# Patient Record
Sex: FEMALE | Race: WHITE | NOT HISPANIC OR LATINO | Employment: FULL TIME | URBAN - METROPOLITAN AREA
[De-identification: names, ages, dates, MRNs, and addresses within clinical notes are randomized per-mention and may not be internally consistent; named-entity substitution may affect disease eponyms.]

---

## 2022-03-01 ENCOUNTER — OFFICE VISIT (OUTPATIENT)
Dept: OBSTETRICS AND GYNECOLOGY | Facility: CLINIC | Age: 27
End: 2022-03-01

## 2022-03-01 VITALS
SYSTOLIC BLOOD PRESSURE: 124 MMHG | DIASTOLIC BLOOD PRESSURE: 75 MMHG | HEIGHT: 63 IN | WEIGHT: 159 LBS | BODY MASS INDEX: 28.17 KG/M2

## 2022-03-01 DIAGNOSIS — Z30.019 ENCOUNTER FOR INITIAL PRESCRIPTION OF CONTRACEPTIVES, UNSPECIFIED CONTRACEPTIVE: ICD-10-CM

## 2022-03-01 DIAGNOSIS — N89.8 VAGINAL DISCHARGE: ICD-10-CM

## 2022-03-01 DIAGNOSIS — Z01.419 WOMEN'S ANNUAL ROUTINE GYNECOLOGICAL EXAMINATION: Primary | ICD-10-CM

## 2022-03-01 PROCEDURE — 99385 PREV VISIT NEW AGE 18-39: CPT | Performed by: NURSE PRACTITIONER

## 2022-03-01 PROCEDURE — 99213 OFFICE O/P EST LOW 20 MIN: CPT | Performed by: NURSE PRACTITIONER

## 2022-03-01 RX ORDER — ALPRAZOLAM 0.25 MG/1
TABLET ORAL
COMMUNITY
Start: 2022-02-09

## 2022-03-01 RX ORDER — TERBINAFINE HYDROCHLORIDE 250 MG/1
TABLET ORAL
COMMUNITY
Start: 2022-01-25 | End: 2022-06-09

## 2022-03-01 NOTE — PROGRESS NOTES
GYN Annual Exam     Chief Complaint   Patient presents with   • Gynecologic Exam     New patient annual exam      HPI    Jacqueline Paredes is a 26 y.o. female who presents for annual well woman exam.  She is sexually active. Periods are regular every 28-30 days, lasting 5 days. Dysmenorrhea:mild, occurring first 1-2 days of flow. Cyclic symptoms include pelvic pain. Performing SBE:occas. C/o 2-3 episodes of bloody vaginal discharge, with last episode being 2 months ago, she feels there could be a correlation with this and missed doses of anti-depressant. She has one new partner. Denies pelvic pain    Sees gyn-oncology at  for hx of ovarian cyst and prior oophorectomy, she thinks was left that was removed.     She would like to discuss contraceptive options today. She has used NAT in the past. Denies history of migraine with aura, denies history of DVT, there is no family history of DVT. She is a nonsmoker.     This is my first time meeting Jacqueline Paredes  She is new to our office  OB History    No obstetric history on file.       LMP- 2/16/22  Current contraception: condoms  Last Pap- she is unsure  History of abnormal Pap smear: no  History of STD-denies  Family history of uterine, colon or ovarian cancer: no  Family history of breast cancer: no    Past Medical History:   Diagnosis Date   • Anxiety    • Depression        Past Surgical History:   Procedure Laterality Date   • LAPAROTOMY OOPHERECTOMY           Current Outpatient Medications:   •  ALPRAZolam (XANAX) 0.25 MG tablet, , Disp: , Rfl:   •  desvenlafaxine (PRISTIQ) 50 MG 24 hr tablet, Take 50 mg by mouth Daily., Disp: , Rfl:   •  terbinafine (lamiSIL) 250 MG tablet, , Disp: , Rfl:     Allergies   Allergen Reactions   • Latex Other (See Comments)       Social History     Tobacco Use   • Smoking status: Never Smoker   • Smokeless tobacco: Never Used   Substance Use Topics   • Alcohol use: Yes     Comment: soc.   • Drug use: Not on file       History reviewed. No  "pertinent family history.    Review of Systems   Constitutional: Negative for chills, fatigue and fever.   Gastrointestinal: Negative for abdominal distention, abdominal pain, nausea and vomiting.   Genitourinary: Positive for vaginal discharge. Negative for breast discharge, breast lump, breast pain, dysuria, menstrual problem, pelvic pain, pelvic pressure, vaginal bleeding and vaginal pain.   Musculoskeletal: Negative for gait problem.   Skin: Negative for rash.   Neurological: Negative for dizziness and headache.   Psychiatric/Behavioral: Negative for behavioral problems.       /75   Ht 160 cm (63\")   Wt 72.1 kg (159 lb)   LMP 02/16/2022   BMI 28.17 kg/m²     Physical Exam  Constitutional:       Appearance: Normal appearance.   Genitourinary:      Vulva, bladder and urethral meatus normal.      No lesions in the vagina.      Right Labia: No rash, tenderness, lesions, skin changes or Bartholin's cyst.     Left Labia: No tenderness, lesions, skin changes, Bartholin's cyst or rash.     No labial fusion noted.      No inguinal adenopathy present in the right or left side.     Vaginal discharge (thick, clear, moderate amount) present.      No vaginal erythema, tenderness, bleeding or ulceration.      No vaginal prolapse present.     No vaginal atrophy present.       Right Adnexa: not tender, not full, not palpable, no mass present and not absent.     Left Adnexa: not tender, not full, not palpable, no mass present and not absent.     No cervical motion tenderness, discharge, friability, lesion, polyp, nabothian cyst or eversion.      Uterus is not enlarged, fixed, tender, irregular or prolapsed.      No uterine mass detected.     No urethral tenderness or mass present.      Pelvic exam was performed with patient in the lithotomy position.   Breasts: Breasts are symmetrical.      Right: Present. No swelling, bleeding, inverted nipple, mass, nipple discharge, skin change, tenderness, breast implant, axillary " adenopathy or supraclavicular adenopathy.      Left: Present. No swelling, bleeding, inverted nipple, mass, nipple discharge, skin change, tenderness, breast implant, axillary adenopathy or supraclavicular adenopathy.       HENT:      Head: Normocephalic and atraumatic.   Eyes:      Pupils: Pupils are equal, round, and reactive to light.   Cardiovascular:      Rate and Rhythm: Normal rate.   Pulmonary:      Effort: Pulmonary effort is normal.   Abdominal:      General: There is no distension.      Palpations: Abdomen is soft. There is no mass.      Tenderness: There is no abdominal tenderness. There is no guarding.      Hernia: No hernia is present. There is no hernia in the left inguinal area or right inguinal area.   Musculoskeletal:         General: Normal range of motion.      Cervical back: Normal range of motion and neck supple. No tenderness.   Lymphadenopathy:      Cervical: No cervical adenopathy.      Upper Body:      Right upper body: No supraclavicular, axillary or pectoral adenopathy.      Left upper body: No supraclavicular, axillary or pectoral adenopathy.      Lower Body: No right inguinal adenopathy. No left inguinal adenopathy.   Neurological:      General: No focal deficit present.      Mental Status: She is alert and oriented to person, place, and time.      Cranial Nerves: No cranial nerve deficit.   Skin:     General: Skin is warm and dry.   Psychiatric:         Mood and Affect: Mood normal.         Behavior: Behavior normal.         Thought Content: Thought content normal.         Judgment: Judgment normal.   Vitals and nursing note reviewed.       Assessment   Diagnoses and all orders for this visit:    1. Women's annual routine gynecological examination (Primary)  -     IGP, Rfx Aptima HPV ASCU    2. Encounter for initial prescription of contraceptives, unspecified contraceptive    3. Vaginal discharge  -     NuSwab VG+ - Swab, Vagina       Plan   1. Well woman exam: Pap collected Yes.  Recommend MVI daily.    2. Contraception: Discussed contraception options at length including pills, patch, vaginal ring, POPs,  injection, implant, and IUDs.  The risks and benefits of the methods were discussed including but not limited to the increased risk of heart attack, blood clot, and stroke.  It was discussed the contraception does not protect against sexually transmitted infections and condoms are encouraged. She would like to consider her options. We discussed placement procedures, risks vs benefits of IUD. She was provided with pamphlets for Brenda, Kyleena, Mirena, and Paraguard IUDs  3. STD: Enc condoms. STD screen today- Yes. NuSwab  4. Smoking status: nonsmoker  5.  Encouraged annual mammogram screening starting at age 40. Instructed on how to perform SBE. Encouraged breast health self awareness.  6.    Encouraged 150 minutes of exercise per week if not medially contraindicated.   7.    Patient's Body mass index is 28.17 kg/m². indicating that she is overweight by BMI (BMI 25-29.9).   8.    CLARE signed to obtain records from . Several times she mentions seeing local providers, offered referral to uro-gyn. She declines at this time    Follow Up one year or PRN    Rebecca Sorensen, APRN  3/1/2022  14:59 EST

## 2022-03-03 LAB
A VAGINAE DNA VAG QL NAA+PROBE: NORMAL SCORE
BVAB2 DNA VAG QL NAA+PROBE: NORMAL SCORE
C ALBICANS DNA VAG QL NAA+PROBE: NEGATIVE
C GLABRATA DNA VAG QL NAA+PROBE: NEGATIVE
C TRACH DNA VAG QL NAA+PROBE: NEGATIVE
MEGA1 DNA VAG QL NAA+PROBE: NORMAL SCORE
N GONORRHOEA DNA VAG QL NAA+PROBE: NEGATIVE
T VAGINALIS DNA VAG QL NAA+PROBE: NEGATIVE

## 2022-03-08 LAB
CONV .: ABNORMAL
CYTOLOGIST CVX/VAG CYTO: ABNORMAL
CYTOLOGY CVX/VAG DOC CYTO: ABNORMAL
CYTOLOGY CVX/VAG DOC THIN PREP: ABNORMAL
DX ICD CODE: ABNORMAL
DX ICD CODE: ABNORMAL
HIV 1 & 2 AB SER-IMP: ABNORMAL
HPV I/H RISK 4 DNA CVX QL PROBE+SIG AMP: NEGATIVE
OTHER STN SPEC: ABNORMAL
PATHOLOGIST CVX/VAG CYTO: ABNORMAL
STAT OF ADQ CVX/VAG CYTO-IMP: ABNORMAL

## 2022-03-11 ENCOUNTER — TELEPHONE (OUTPATIENT)
Dept: OBSTETRICS AND GYNECOLOGY | Facility: CLINIC | Age: 27
End: 2022-03-11

## 2022-03-11 NOTE — TELEPHONE ENCOUNTER
I called Jacqueline Paredes ot review pap smear results. No answer, left VM to return my call.    Pap smear returned with Atypical cells of undetermined significance. Negative for HPV. Recommend repeating pap smear in one year.      Rebecca Sorensen, APRN  3/11/22  11:12am

## 2022-03-11 NOTE — TELEPHONE ENCOUNTER
I returned Jacqueline Paredes phone call. We reviewed ASCUS pap smear result. Recommend she repeat pap smear in one year.    Rebecca Sorensen, APRN  3/11/22  12:52pm

## 2022-03-11 NOTE — TELEPHONE ENCOUNTER
Pt returning your call. I relayed the results to her but she has questions concerning the atypical cells.     Thanks,  Bhanu

## 2022-03-16 ENCOUNTER — TELEPHONE (OUTPATIENT)
Dept: OBSTETRICS AND GYNECOLOGY | Facility: CLINIC | Age: 27
End: 2022-03-16

## 2022-03-17 ENCOUNTER — TELEPHONE (OUTPATIENT)
Dept: OBSTETRICS AND GYNECOLOGY | Facility: CLINIC | Age: 27
End: 2022-03-17

## 2022-03-17 NOTE — TELEPHONE ENCOUNTER
I returned Jacqueline Paredes call. No answer, left VM to return my call.     Rebecca Sorensen, APRN  3/17/22  8:29am

## 2022-03-17 NOTE — TELEPHONE ENCOUNTER
Jacqueline Paredes returned my call. She is concerned because her boyfriend was recently treated for genital warts. Notified pt recent testing was negative for HPV. She has completed Gardisil vaccine series. Discussed HPV. Encouraged condoms with IC.     Rebecca Sorensen, APRN  3/17/22  11:28am

## 2022-06-09 ENCOUNTER — OFFICE VISIT (OUTPATIENT)
Dept: OBSTETRICS AND GYNECOLOGY | Facility: CLINIC | Age: 27
End: 2022-06-09

## 2022-06-09 VITALS
HEART RATE: 67 BPM | DIASTOLIC BLOOD PRESSURE: 68 MMHG | WEIGHT: 163.2 LBS | BODY MASS INDEX: 28.91 KG/M2 | SYSTOLIC BLOOD PRESSURE: 104 MMHG

## 2022-06-09 DIAGNOSIS — N93.9 VAGINAL SPOTTING: Primary | ICD-10-CM

## 2022-06-09 DIAGNOSIS — Z30.019 ENCOUNTER FOR INITIAL PRESCRIPTION OF CONTRACEPTIVES, UNSPECIFIED CONTRACEPTIVE: ICD-10-CM

## 2022-06-09 DIAGNOSIS — R30.0 DYSURIA: ICD-10-CM

## 2022-06-09 DIAGNOSIS — R10.2 VAGINAL PAIN: ICD-10-CM

## 2022-06-09 LAB
BILIRUB BLD-MCNC: NEGATIVE MG/DL
CLARITY, POC: CLEAR
COLOR UR: YELLOW
GLUCOSE UR STRIP-MCNC: NEGATIVE MG/DL
KETONES UR QL: NEGATIVE
LEUKOCYTE EST, POC: NEGATIVE
NITRITE UR-MCNC: NEGATIVE MG/ML
PH UR: 6.5 [PH] (ref 5–8)
PROT UR STRIP-MCNC: NEGATIVE MG/DL
RBC # UR STRIP: NEGATIVE /UL
SP GR UR: 1.02 (ref 1–1.03)
UROBILINOGEN UR QL: NORMAL

## 2022-06-09 PROCEDURE — 99215 OFFICE O/P EST HI 40 MIN: CPT | Performed by: NURSE PRACTITIONER

## 2022-06-09 PROCEDURE — 81002 URINALYSIS NONAUTO W/O SCOPE: CPT | Performed by: NURSE PRACTITIONER

## 2022-06-09 NOTE — PROGRESS NOTES
"Chief Complaint   Patient presents with   • Contraception     Wants to discuss BC options   • Pelvic Pain   • Vaginal Discharge     Pt c/o bloody discharge   • Dyspareunia     Pt states some pain with intercourse.   • Dysuria     Pt states she does have some burning when wiping after urinating      SUBJECTIVE:     Jacqueline Paredes is a 27 y.o.  who presents with several complaints. C/o discomfort with urination, denies burning with urination. She has pain when she wipes after voiding. C/o vaginal pain, \"like a yeast infection\" however she does not think that she has a yeast infection at this time. Symptoms started one month ago. C/o intermittent vaginal spotting for 1-2 months, last episode of spotting was one week ago. C/o \"discomfort\" with IC, denies pain with IC, this started 4-6 weeks ago. She denies pelvic pain as listed above. Denies new partners. Denies vaginal itching or odor. Denies a change in soaps, hygiene products. She is not using douches.  Denies pelvic pain or dysuria.  This is a new problem. LMP 5/15/22. Using condoms with IC \"usually\"    She would like to discuss contraceptive options today. We have discussed in the past and she has been provided with pamphlets at a prior visit.  She has used NAT in the past. Denies history of migraine with aura, denies history of DVT, there is no family history of DVT. She is a nonsmoker.     Past Medical History:   Diagnosis Date   • Anxiety    • Depression       Past Surgical History:   Procedure Laterality Date   • LAPAROTOMY OOPHERECTOMY        Social History     Tobacco Use   • Smoking status: Never Smoker   • Smokeless tobacco: Never Used   Substance Use Topics   • Alcohol use: Yes     Comment: soc.     OB History    Para Term  AB Living   0 0 0 0 0 0   SAB IAB Ectopic Molar Multiple Live Births   0 0 0 0 0 0        Review of Systems   Constitutional: Negative for chills, fatigue and fever.   Gastrointestinal: Negative for abdominal distention, " "abdominal pain, constipation, diarrhea, nausea and vomiting.   Genitourinary: Positive for dyspareunia (\"discomfort\"), dysuria ( \"discomfort\"), menstrual problem, vaginal bleeding and vaginal pain. Negative for frequency, pelvic pain, urgency and vaginal discharge.        - vaginal itching or odor  - postcoital spotting   Musculoskeletal: Negative for back pain and gait problem.       OBJECTIVE:   Vitals:    06/09/22 1351   BP: 104/68   Pulse: 67   Weight: 74 kg (163 lb 3.2 oz)        Physical Exam  Constitutional:       General: She is not in acute distress.     Appearance: Normal appearance. She is not ill-appearing, toxic-appearing or diaphoretic.   Genitourinary:      Bladder and urethral meatus normal.      No lesions in the vagina.      Right Labia: No rash, tenderness, lesions, skin changes or Bartholin's cyst.     Left Labia: No tenderness, lesions, skin changes, Bartholin's cyst or rash.     No labial fusion noted.      No inguinal adenopathy present in the right or left side.     No vaginal discharge, erythema, tenderness, bleeding, ulceration or granulation tissue.      No vaginal prolapse present.     No vaginal atrophy present.       Right Adnexa: not tender, not full, not palpable, no mass present and not absent.     Left Adnexa: not tender, not full, not palpable, no mass present and not absent.     No cervical motion tenderness, discharge, friability, lesion, polyp, nabothian cyst or eversion.      Uterus is not enlarged, fixed, tender, irregular or prolapsed.      No uterine mass detected.  Cardiovascular:      Rate and Rhythm: Normal rate.   Pulmonary:      Effort: Pulmonary effort is normal.   Abdominal:      General: There is no distension.      Palpations: Abdomen is soft. There is no mass.      Tenderness: There is no abdominal tenderness. There is no guarding.      Hernia: No hernia is present. There is no hernia in the left inguinal area or right inguinal area.   Musculoskeletal:         " General: Normal range of motion.      Cervical back: Normal range of motion.   Lymphadenopathy:      Lower Body: No right inguinal adenopathy. No left inguinal adenopathy.   Neurological:      General: No focal deficit present.      Mental Status: She is alert and oriented to person, place, and time.      Cranial Nerves: No cranial nerve deficit.   Skin:     General: Skin is warm.   Psychiatric:         Mood and Affect: Mood normal.         Behavior: Behavior normal.         Thought Content: Thought content normal.         Judgment: Judgment normal.   Vitals and nursing note reviewed.       Brief Urine Lab Results  (Last result in the past 365 days)      Color   Clarity   Blood   Leuk Est   Nitrite   Protein   CREAT   Urine HCG        06/09/22 1723 Yellow   Clear   Negative   Negative   Negative   Negative                 Assessment/Plan    Diagnoses and all orders for this visit:    1. Vaginal spotting (Primary)  -     NuSwab VG+ - Swab, Vagina    2. Dysuria  -     Urine Culture - Urine, Urine, Clean Catch  -     POC Urinalysis Dipstick    3. Vaginal pain    4. Encounter for initial prescription of contraceptives, unspecified contraceptive    Urine dip negative in office, sent for urine culture  Vaginal cultures obtained. Normal pelvic exam  Discussed completing TVUS. Pt is followed by uro/oncology for hx ovarian tumor, she has had oophorectomy. She has upcoming appointment with them and completes TVUS with them annually. She think she may need to reschedule this appointment though. She will let me know if she does not complete with them and will plan to complete in our office at that time  She asks if she should continue to see uro/gyn. I encouraged her to do so.  Encouraged condoms with IC, discussed possible causes of her complaints including but not limited to STI, UTI  Discussed contraception options at length including pills, patch, vaginal ring, POPs,  injection, implant, and IUDs.  The risks and benefits of  the methods were discussed including but not limited to the increased risk of heart attack, blood clot, and stroke.  It was discussed the contraception does not protect against sexually transmitted infections and condoms are encouraged. The patient desires to start kyleena IUD. Discussed uses and side effects, placement procedures, risks vs benefits.     Follow up: PRN and annually     I spent 45 minutes caring for Jacqueline on this date of service. This time includes time spent by me in the following activities: reviewing tests, obtaining and/or reviewing a separately obtained history, performing a medically appropriate examination and/or evaluation, counseling and educating the patient/family/caregiver, ordering medications, tests, or procedures and documenting information in the medical record    Rebecca Sorensen, APRN  6/9/2022  19:46 EDT

## 2022-06-11 LAB
BACTERIA UR CULT: NO GROWTH
BACTERIA UR CULT: NORMAL

## 2022-06-15 LAB
A VAGINAE DNA VAG QL NAA+PROBE: ABNORMAL SCORE
BVAB2 DNA VAG QL NAA+PROBE: ABNORMAL SCORE
C ALBICANS DNA VAG QL NAA+PROBE: POSITIVE
C GLABRATA DNA VAG QL NAA+PROBE: NEGATIVE
C TRACH DNA VAG QL NAA+PROBE: NEGATIVE
MEGA1 DNA VAG QL NAA+PROBE: ABNORMAL SCORE
N GONORRHOEA DNA VAG QL NAA+PROBE: NEGATIVE
T VAGINALIS DNA VAG QL NAA+PROBE: NEGATIVE

## 2022-06-15 RX ORDER — FLUCONAZOLE 150 MG/1
150 TABLET ORAL ONCE
Qty: 1 TABLET | Refills: 0 | Status: SHIPPED | OUTPATIENT
Start: 2022-06-15 | End: 2022-06-15

## 2022-06-16 ENCOUNTER — TELEPHONE (OUTPATIENT)
Dept: OBSTETRICS AND GYNECOLOGY | Facility: CLINIC | Age: 27
End: 2022-06-16

## 2022-06-28 NOTE — TELEPHONE ENCOUNTER
Kyleena device received - I attempted to call patient.  L/V/M that she will need to call when she starts next period and schedule insertion with US.

## 2022-08-10 ENCOUNTER — TELEPHONE (OUTPATIENT)
Dept: OBSTETRICS AND GYNECOLOGY | Facility: CLINIC | Age: 27
End: 2022-08-10

## 2022-08-10 NOTE — TELEPHONE ENCOUNTER
Attempted to return phone call - l/v/m    Pt will need to come into office to sign Paragard papers to check benefits and order device.
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
## 2022-08-10 NOTE — TELEPHONE ENCOUNTER
Pt is calling and states she would like to switch to copper IUD instead. She is also asking for a call from provider when available.    Thank you,   Jenny

## 2023-01-31 ENCOUNTER — TELEPHONE (OUTPATIENT)
Dept: OBSTETRICS AND GYNECOLOGY | Facility: CLINIC | Age: 28
End: 2023-01-31
Payer: COMMERCIAL

## 2023-03-06 ENCOUNTER — OFFICE VISIT (OUTPATIENT)
Dept: OBSTETRICS AND GYNECOLOGY | Facility: CLINIC | Age: 28
End: 2023-03-06
Payer: COMMERCIAL

## 2023-03-06 VITALS
SYSTOLIC BLOOD PRESSURE: 105 MMHG | HEART RATE: 62 BPM | HEIGHT: 63 IN | BODY MASS INDEX: 28.35 KG/M2 | DIASTOLIC BLOOD PRESSURE: 70 MMHG | WEIGHT: 160 LBS

## 2023-03-06 DIAGNOSIS — N93.9 ABNORMAL UTERINE BLEEDING (AUB): ICD-10-CM

## 2023-03-06 DIAGNOSIS — Z01.419 WOMEN'S ANNUAL ROUTINE GYNECOLOGICAL EXAMINATION: Primary | ICD-10-CM

## 2023-03-06 DIAGNOSIS — R87.610 ATYPICAL SQUAMOUS CELLS OF UNDETERMINED SIGNIFICANCE ON CYTOLOGIC SMEAR OF CERVIX (ASC-US): ICD-10-CM

## 2023-03-06 PROCEDURE — 99395 PREV VISIT EST AGE 18-39: CPT | Performed by: NURSE PRACTITIONER

## 2023-03-06 PROCEDURE — 99213 OFFICE O/P EST LOW 20 MIN: CPT | Performed by: NURSE PRACTITIONER

## 2023-03-06 NOTE — PROGRESS NOTES
GYN Annual Exam     Chief Complaint   Patient presents with   • Annual Exam     Last AE & Pap 3/2022     HPI    Jacqueline Paredes is a 27 y.o. female who presents for annual well woman exam with a  problem.  She is sexually active. Periods are regular every 28-30 days, lasting 5 days. Dysmenorrhea:none. Cyclic symptoms include none. Performing SBE:occas. C/o light spotting between menses, this occurring approximately once time per month during her 28 day cycle. Bleeding will last 1-2 days. This started approx 6-9 months ago. In October she had 2 full menses.  Denies new partners. She is not using condoms with IC. She feels this bleeding is worsened by increased physical activity     She has seen gyn-oncology at  in the past for hx of ovarian cyst and prior oophorectomy, she thinks was left that was removed. States she has been discharged from care, therapy complete.   OB History        0    Para   0    Term   0       0    AB   0    Living   0       SAB   0    IAB   0    Ectopic   0    Molar   0    Multiple   0    Live Births   0                LMP- 2/10/23  Current contraception: none  Last Pap- 3/2022 ASCUS, HPV negative  History of abnormal Pap smear: yes  History of STD-denies  Family history of uterine, colon or ovarian cancer: no  Family history of breast cancer: no  Gardasil Vaccine: completed    Past Medical History:   Diagnosis Date   • Anxiety    • Depression        Past Surgical History:   Procedure Laterality Date   • LAPAROTOMY OOPHERECTOMY           Current Outpatient Medications:   •  ALPRAZolam (XANAX) 0.25 MG tablet, , Disp: , Rfl:     Allergies   Allergen Reactions   • Latex Other (See Comments)       Social History     Tobacco Use   • Smoking status: Never   • Smokeless tobacco: Never   Vaping Use   • Vaping Use: Never used   Substance Use Topics   • Alcohol use: Yes     Comment: soc.   • Drug use: Not Currently       Family History   Problem Relation Age of Onset   • Deep vein thrombosis  "Father    • Cancer Mother         Thyroid       Review of Systems   Constitutional: Negative for chills, fatigue and fever.   Gastrointestinal: Negative for abdominal distention, abdominal pain, nausea and vomiting.   Genitourinary: Positive for menstrual problem. Negative for breast discharge, breast lump, breast pain, dysuria, pelvic pain, pelvic pressure, vaginal bleeding, vaginal discharge and vaginal pain.   Musculoskeletal: Negative for gait problem.   Skin: Negative for rash.   Neurological: Negative for dizziness and headache.   Psychiatric/Behavioral: Negative for behavioral problems.       /70   Pulse 62   Ht 160 cm (63\")   Wt 72.6 kg (160 lb)   LMP 02/10/2023   BMI 28.34 kg/m²     Physical Exam  Constitutional:       General: She is not in acute distress.     Appearance: Normal appearance. She is not ill-appearing, toxic-appearing or diaphoretic.   Genitourinary:      Vulva, bladder and urethral meatus normal.      No lesions in the vagina.      Right Labia: No rash, tenderness, lesions, skin changes or Bartholin's cyst.     Left Labia: No tenderness, lesions, skin changes, Bartholin's cyst or rash.     No labial fusion noted.      No inguinal adenopathy present in the right or left side.     No vaginal discharge, erythema, tenderness, bleeding or ulceration.      No vaginal prolapse present.     No vaginal atrophy present.       Right Adnexa: not tender, not full, not palpable, no mass present and not absent.     Left Adnexa: not tender, not full, not palpable, no mass present and not absent.     No cervical motion tenderness, discharge, friability, lesion, polyp, nabothian cyst or eversion.      Uterus is not enlarged, fixed, tender, irregular or prolapsed.      No uterine mass detected.     No urethral tenderness or mass present.      Pelvic exam was performed with patient in the lithotomy position.   Breasts:     Breasts are symmetrical.      Right: Present. No swelling, bleeding, inverted " nipple, mass, nipple discharge, skin change, tenderness or breast implant.      Left: Present. No swelling, bleeding, inverted nipple, mass, nipple discharge, skin change, tenderness or breast implant.   HENT:      Head: Normocephalic and atraumatic.   Eyes:      Pupils: Pupils are equal, round, and reactive to light.   Cardiovascular:      Rate and Rhythm: Normal rate.   Pulmonary:      Effort: Pulmonary effort is normal.   Abdominal:      General: There is no distension.      Palpations: Abdomen is soft. There is no mass.      Tenderness: There is no abdominal tenderness. There is no guarding.      Hernia: No hernia is present. There is no hernia in the left inguinal area or right inguinal area.   Musculoskeletal:         General: Normal range of motion.      Cervical back: Normal range of motion and neck supple. No tenderness.   Lymphadenopathy:      Cervical: No cervical adenopathy.      Upper Body:      Right upper body: No supraclavicular, axillary or pectoral adenopathy.      Left upper body: No supraclavicular, axillary or pectoral adenopathy.      Lower Body: No right inguinal adenopathy. No left inguinal adenopathy.   Neurological:      General: No focal deficit present.      Mental Status: She is alert and oriented to person, place, and time.      Cranial Nerves: No cranial nerve deficit.   Skin:     General: Skin is warm and dry.   Psychiatric:         Mood and Affect: Mood normal.         Behavior: Behavior normal.         Thought Content: Thought content normal.         Judgment: Judgment normal.   Vitals and nursing note reviewed.         Assessment   Diagnoses and all orders for this visit:    1. Women's annual routine gynecological examination (Primary)  -     IGP, Rfx Aptima HPV ASCU    2. Atypical squamous cells of undetermined significance on cytologic smear of cervix (ASC-US)  -     IGP, Rfx Aptima HPV ASCU    3. Abnormal uterine bleeding (AUB)  -     CBC & Differential  -     Hemoglobin A1c  -      Prolactin  -     TSH  -     Vitamin D,25-Hydroxy  -     NuSwab VG+ - Swab, Vagina  -     US Non-ob Transvaginal; Future         Plan   1. Well woman exam: Pap collected Yes. Recommend MVI daily.    2. Contraception: declines  3. STD: Enc condoms. Desires STD screen today- No.   4. Smoking status: nonsmoker  5.  Encouraged annual mammogram screening starting at age 40. Instructed on how to perform SBE. Encouraged breast health self awareness.  6.    Encouraged 150 minutes of exercise per week if not medially contraindicated.   7.    Overweight by BMI 28.34  8.    Discussed work up for AUB, including labs, vaginal cultures, and TVUS. She would like to complete this work up. Discussed treatment options with contraceptive in the event labs, cultures, and TVUS return normal.      Follow Up one year or PRN    Rebecca Sorensen, APRN  3/6/2023  18:00 EST

## 2023-03-07 LAB
25(OH)D3+25(OH)D2 SERPL-MCNC: 42.8 NG/ML (ref 30–100)
BASOPHILS # BLD AUTO: 0.1 X10E3/UL (ref 0–0.2)
BASOPHILS NFR BLD AUTO: 1 %
EOSINOPHIL # BLD AUTO: 0.1 X10E3/UL (ref 0–0.4)
EOSINOPHIL NFR BLD AUTO: 1 %
ERYTHROCYTE [DISTWIDTH] IN BLOOD BY AUTOMATED COUNT: 12.6 % (ref 11.7–15.4)
HBA1C MFR BLD: 5.1 % (ref 4.8–5.6)
HCT VFR BLD AUTO: 36.4 % (ref 34–46.6)
HGB BLD-MCNC: 12.4 G/DL (ref 11.1–15.9)
IMM GRANULOCYTES # BLD AUTO: 0 X10E3/UL (ref 0–0.1)
IMM GRANULOCYTES NFR BLD AUTO: 0 %
LYMPHOCYTES # BLD AUTO: 2.7 X10E3/UL (ref 0.7–3.1)
LYMPHOCYTES NFR BLD AUTO: 43 %
MCH RBC QN AUTO: 28.4 PG (ref 26.6–33)
MCHC RBC AUTO-ENTMCNC: 34.1 G/DL (ref 31.5–35.7)
MCV RBC AUTO: 83 FL (ref 79–97)
MONOCYTES # BLD AUTO: 0.4 X10E3/UL (ref 0.1–0.9)
MONOCYTES NFR BLD AUTO: 6 %
NEUTROPHILS # BLD AUTO: 3.1 X10E3/UL (ref 1.4–7)
NEUTROPHILS NFR BLD AUTO: 49 %
PLATELET # BLD AUTO: 265 X10E3/UL (ref 150–450)
PROLACTIN SERPL-MCNC: 13.9 NG/ML (ref 4.8–23.3)
RBC # BLD AUTO: 4.37 X10E6/UL (ref 3.77–5.28)
TSH SERPL DL<=0.005 MIU/L-ACNC: 1.38 UIU/ML (ref 0.45–4.5)
WBC # BLD AUTO: 6.3 X10E3/UL (ref 3.4–10.8)

## 2023-03-10 ENCOUNTER — TELEPHONE (OUTPATIENT)
Dept: OBSTETRICS AND GYNECOLOGY | Facility: CLINIC | Age: 28
End: 2023-03-10
Payer: COMMERCIAL

## 2023-03-10 NOTE — TELEPHONE ENCOUNTER
I called Jacqueline Paredes to review lab results. No answer, left message to return my call.    Please let her know that her lab work all returned with in normal limits. Her vitamin D level, thyroid level, prolactin, A1C (glucose), and CBC were all normal. Vaginal cultures were negative. Her pap smear is still pending at this time.     Rebecca Sorensen, APRN  3/10/2023  2:26pm

## 2023-03-11 LAB
CONV .: NORMAL
CYTOLOGIST CVX/VAG CYTO: NORMAL
CYTOLOGY CVX/VAG DOC CYTO: NORMAL
CYTOLOGY CVX/VAG DOC THIN PREP: NORMAL
DX ICD CODE: NORMAL
HIV 1 & 2 AB SER-IMP: NORMAL
OTHER STN SPEC: NORMAL
STAT OF ADQ CVX/VAG CYTO-IMP: NORMAL

## 2024-03-11 ENCOUNTER — OFFICE VISIT (OUTPATIENT)
Dept: OBSTETRICS AND GYNECOLOGY | Facility: CLINIC | Age: 29
End: 2024-03-11
Payer: COMMERCIAL

## 2024-03-11 VITALS
SYSTOLIC BLOOD PRESSURE: 101 MMHG | HEIGHT: 63 IN | DIASTOLIC BLOOD PRESSURE: 71 MMHG | WEIGHT: 168 LBS | BODY MASS INDEX: 29.77 KG/M2

## 2024-03-11 DIAGNOSIS — Z01.419 WOMEN'S ANNUAL ROUTINE GYNECOLOGICAL EXAMINATION: Primary | ICD-10-CM

## 2024-03-11 PROCEDURE — 99395 PREV VISIT EST AGE 18-39: CPT | Performed by: NURSE PRACTITIONER

## 2024-03-11 RX ORDER — FLUTICASONE PROPIONATE 50 MCG
SPRAY, SUSPENSION (ML) NASAL EVERY 24 HOURS
COMMUNITY
Start: 2024-03-01

## 2024-03-11 RX ORDER — FEXOFENADINE HCL AND PSEUDOEPHEDRINE HCI 180; 240 MG/1; MG/1
1 TABLET, EXTENDED RELEASE ORAL DAILY
COMMUNITY

## 2024-03-11 NOTE — PROGRESS NOTES
GYN Annual Exam     Chief Complaint   Patient presents with    Gynecologic Exam     Pt here today for AE, last pap  NIL     HPI    Jacqueline Paredes is a 28 y.o. female who presents for annual well woman exam.  She is sexually active. Periods are regular every 28-30 days, lasting 5 days. Dysmenorrhea:mild, occurring first 1-2 days of flow. No intermenstrual bleeding, spotting, or discharge. Performing SBE:occasionally. Gyn hx significant for prior left oophorectomy for ovarian cyst, this was completed with gyn/oncology.     OB History          0    Para   0    Term   0       0    AB   0    Living   0         SAB   0    IAB   0    Ectopic   0    Molar   0    Multiple   0    Live Births   0            LMP- 24  Current contraception: none  Last Pap-  NIL  History of abnormal Pap smear: ASCUS, HPV negative 3/2022  History of STD-denies  Family history of uterine, colon or ovarian cancer: no  Family history of breast cancer: no  Gardasil Vaccine: completed  Past Medical History:   Diagnosis Date    Anxiety     Depression        Past Surgical History:   Procedure Laterality Date    CLAVICLE SURGERY      LAPAROTOMY OOPHERECTOMY           Current Outpatient Medications:     fexofenadine-pseudoephedrine (Allegra-D Allergy & Congestion) 180-240 MG per 24 hr tablet, Take 1 tablet by mouth Daily., Disp: , Rfl:     fluticasone (Flonase Allergy Relief) 50 MCG/ACT nasal spray, Daily., Disp: , Rfl:     ALPRAZolam (XANAX) 0.25 MG tablet, , Disp: , Rfl:     Allergies   Allergen Reactions    Latex Other (See Comments)       Social History     Tobacco Use    Smoking status: Never    Smokeless tobacco: Never   Vaping Use    Vaping status: Never Used   Substance Use Topics    Alcohol use: Yes     Comment: soc.    Drug use: Not Currently       Family History   Problem Relation Age of Onset    Deep vein thrombosis Father     Cancer Mother         Thyroid       Review of Systems   Constitutional:  Negative for  "chills, fatigue and fever.   Gastrointestinal:  Negative for abdominal distention, abdominal pain, nausea and vomiting.   Genitourinary:  Negative for breast discharge, breast lump, breast pain, dysuria, frequency, menstrual problem, pelvic pain, pelvic pressure, urgency, vaginal bleeding, vaginal discharge and vaginal pain.   Musculoskeletal:  Negative for gait problem.   Skin:  Negative for rash.   Neurological:  Negative for dizziness and headache.   Psychiatric/Behavioral:  Negative for behavioral problems.        /71   Ht 160 cm (63\")   Wt 76.2 kg (168 lb)   LMP 02/23/2024   BMI 29.76 kg/m²     Physical Exam  Constitutional:       General: She is not in acute distress.     Appearance: Normal appearance. She is not ill-appearing, toxic-appearing or diaphoretic.   Genitourinary:      Vulva, bladder and urethral meatus normal.      No lesions in the vagina.      Right Labia: No rash, tenderness, lesions, skin changes or Bartholin's cyst.     Left Labia: No tenderness, lesions, skin changes, Bartholin's cyst or rash.     No labial fusion noted.      No inguinal adenopathy present in the right or left side.     No vaginal discharge, erythema, tenderness, bleeding or ulceration.      No vaginal prolapse present.     No vaginal atrophy present.       Right Adnexa: not tender, not full, not palpable, no mass present and not absent.     Left Adnexa: absent.     Left Adnexa: not tender, not full, not palpable and no mass present.     No cervical motion tenderness, discharge, friability, lesion, polyp, nabothian cyst or eversion.      Uterus is not enlarged, fixed, tender, irregular or prolapsed.      No uterine mass detected.     No urethral tenderness or mass present.      Pelvic exam was performed with patient in the lithotomy position.   Breasts:     Breasts are symmetrical.      Right: Present. No swelling, bleeding, inverted nipple, mass, nipple discharge, skin change, tenderness or breast implant.      " Left: Present. No swelling, bleeding, inverted nipple, mass, nipple discharge, skin change, tenderness or breast implant.   HENT:      Head: Normocephalic and atraumatic.   Eyes:      Pupils: Pupils are equal, round, and reactive to light.   Cardiovascular:      Rate and Rhythm: Normal rate.   Pulmonary:      Effort: Pulmonary effort is normal.   Abdominal:      General: There is no distension.      Palpations: Abdomen is soft. There is no mass.      Tenderness: There is no abdominal tenderness. There is no guarding.      Hernia: No hernia is present. There is no hernia in the left inguinal area or right inguinal area.   Musculoskeletal:         General: Normal range of motion.      Cervical back: Normal range of motion and neck supple. No tenderness.   Lymphadenopathy:      Cervical: No cervical adenopathy.      Upper Body:      Right upper body: No supraclavicular, axillary or pectoral adenopathy.      Left upper body: No supraclavicular, axillary or pectoral adenopathy.      Lower Body: No right inguinal adenopathy. No left inguinal adenopathy.   Neurological:      General: No focal deficit present.      Mental Status: She is alert and oriented to person, place, and time.      Cranial Nerves: No cranial nerve deficit.   Skin:     General: Skin is warm and dry.   Psychiatric:         Mood and Affect: Mood normal.         Behavior: Behavior normal.         Thought Content: Thought content normal.         Judgment: Judgment normal.   Vitals and nursing note reviewed.       Assessment   Diagnoses and all orders for this visit:    1. Women's annual routine gynecological examination (Primary)       Plan   Well woman exam: Pap smear up to date. Recommend MVI daily.    Contraception: declines  STD: Enc condoms. Desires STD screen today- No.   Smoking status: nonsmoker   Encouraged annual mammogram screening starting at age 40. Instructed on how to perform SBE. Encouraged breast health self awareness.  6.    Encouraged  150 minutes of exercise per week if not medially contraindicated.   7.   Overweight by BMI 29.76    Return in about 1 year (around 3/11/2025) for Annual physical.    Rebecca Sorensen, APRN  3/11/2024  15:50 EDT